# Patient Record
Sex: FEMALE | Race: WHITE | Employment: OTHER | URBAN - METROPOLITAN AREA
[De-identification: names, ages, dates, MRNs, and addresses within clinical notes are randomized per-mention and may not be internally consistent; named-entity substitution may affect disease eponyms.]

---

## 2023-09-12 ENCOUNTER — TELEPHONE (OUTPATIENT)
Dept: SURGERY | Age: 63
End: 2023-09-12

## 2023-09-12 NOTE — TELEPHONE ENCOUNTER
[facility] colorectal surgeon    Colonoscopy questionnaire     Pt Name: Jonathan Lamas   MRN: [id number]   YOB: 1960   Primary Care Physician: Nik Hansen MD         Have you ever had a colonoscopy? yes  When was your last colonoscopy? 2020  Were any polyps removed or any other significant findings? Yes, polyps    2. Have you recently had a stool test to check for colon cancer? No  Was it positive? N/A     3. Do you have any family history of colon cancer? Yes  If yes, which family member had colon cancer? B  Were they diagnosed younger or older than the age of 61? Older    4. Do you have a history of Crohn's disease or Ulcerative Colitis? Yes, ulcerative colitis     5. Do you have a history of constipation? No     6. Do you have a history of bloody or black stools? No     Have you ever had surgery done inside your abdomen? Yes  What surgery? Hemicolectomy     8. Are you taking any blood thinners? No              If yes, what is the name of the blood thinner?  N/A     Scheduling:     Previous History      Past Medical History:   Diagnosis Date    Depression     Depression     Diabetes ([address])     Diabetes mellitus ([address])     Diverticulitis     Extremity numbness     Fibromyalgia     Hemorrhoids     HLD (hyperlipidemia)     HTN (hypertension)     Hypercholesterolemia     Hypertension     Hypothyroid     MDRO (multiple drug resistant organisms) resistance 6/30/2013    urine    Osteoarthritis     Sleep apnea     Thyroid disease     hypothyroidism    Wears glasses          Past Surgical History:   Procedure Laterality Date    BLADDER SURGERY  10/4/12        BRAIN SURGERY      right frontal meningioma removal    CERVICAL SPINE SURGERY  6-27-13    anterior cervical fusion C6-7, removal or hardware C3-6    COLON SURGERY  10/4/12        FOOT SURGERY      three left foot surgeries    HYSTERECTOMY      JOINT REPLACEMENT      SPINAL FUSION  6/28/13    neck vertebral fusions

## 2023-09-12 NOTE — TELEPHONE ENCOUNTER
Pt would like to scheduled colonoscopy with dr Samuel Clifton.  Pt states she has seen him in the past she just can not remember when pt can be reached at [phone]

## 2023-09-14 ENCOUNTER — OFFICE VISIT (OUTPATIENT)
Dept: SURGERY | Age: 63
End: 2023-09-14

## 2023-09-14 VITALS
HEIGHT: 64 IN | OXYGEN SATURATION: 98 % | HEART RATE: 64 BPM | WEIGHT: 234.4 LBS | BODY MASS INDEX: 40.02 KG/M2 | DIASTOLIC BLOOD PRESSURE: 62 MMHG | SYSTOLIC BLOOD PRESSURE: 112 MMHG

## 2023-09-14 DIAGNOSIS — R19.4 CHANGE IN BOWEL HABITS: ICD-10-CM

## 2023-09-14 DIAGNOSIS — R15.9 INCONTINENCE OF FECES, UNSPECIFIED FECAL INCONTINENCE TYPE: Primary | ICD-10-CM

## 2023-09-14 DIAGNOSIS — L30.9 DERMATITIS OF PERIANAL REGION: ICD-10-CM

## 2023-09-14 RX ORDER — LANOLIN ALCOHOL/MO/W.PET/CERES
1 CREAM (GRAM) TOPICAL 2 TIMES DAILY
COMMUNITY

## 2023-09-14 RX ORDER — SEMAGLUTIDE 1.34 MG/ML
1 INJECTION, SOLUTION SUBCUTANEOUS
COMMUNITY
Start: 2023-01-26

## 2023-09-14 RX ORDER — GABAPENTIN 600 MG/1
TABLET, FILM COATED ORAL
COMMUNITY
Start: 2023-07-05

## 2023-09-14 RX ORDER — TRAZODONE HYDROCHLORIDE 100 MG/1
100 TABLET ORAL
COMMUNITY
Start: 2023-08-23

## 2023-09-14 RX ORDER — PANTOPRAZOLE SODIUM 40 MG/1
40 TABLET, DELAYED RELEASE ORAL DAILY
COMMUNITY
Start: 2023-08-27

## 2023-09-14 RX ORDER — ATORVASTATIN CALCIUM 80 MG/1
80 TABLET, FILM COATED ORAL DAILY
COMMUNITY
Start: 2023-07-21

## 2023-09-14 RX ORDER — BUPROPION HYDROCHLORIDE 300 MG/1
300 TABLET ORAL DAILY
COMMUNITY
Start: 2023-08-23

## 2023-09-14 RX ORDER — ACARBOSE 25 MG/1
25 TABLET ORAL 3 TIMES DAILY
COMMUNITY
Start: 2023-08-28

## 2023-09-14 RX ORDER — ANORECTAL OINTMENT 15.7; .44; 24; 20.6 G/100G; G/100G; G/100G; G/100G
OINTMENT TOPICAL DAILY
Qty: 1 EACH | Refills: 4 | Status: SHIPPED | OUTPATIENT
Start: 2023-09-14 | End: 2023-09-21

## 2023-09-14 RX ORDER — INSULIN ASPART 100 [IU]/ML
5 INJECTION, SOLUTION INTRAVENOUS; SUBCUTANEOUS
COMMUNITY

## 2023-09-14 RX ORDER — VENLAFAXINE HYDROCHLORIDE 150 MG/1
150 CAPSULE, EXTENDED RELEASE ORAL DAILY
COMMUNITY
Start: 2023-08-23

## 2023-09-14 RX ORDER — BACLOFEN 10 MG/1
10 TABLET ORAL 3 TIMES DAILY PRN
COMMUNITY
Start: 2023-07-24

## 2023-09-14 RX ORDER — OXYBUTYNIN CHLORIDE 5 MG/1
5 TABLET ORAL 2 TIMES DAILY
COMMUNITY
Start: 2023-07-27

## 2023-09-14 ASSESSMENT — ENCOUNTER SYMPTOMS
ANAL BLEEDING: 0
BLOOD IN STOOL: 0
DIARRHEA: 0
COUGH: 0
NAUSEA: 0
VOMITING: 0
BACK PAIN: 1
RECTAL PAIN: 1
ABDOMINAL PAIN: 0
ABDOMINAL DISTENTION: 0
CHEST TIGHTNESS: 0
CONSTIPATION: 0
COLOR CHANGE: 0
APNEA: 1
SHORTNESS OF BREATH: 0
STRIDOR: 0
WHEEZING: 0

## 2023-09-19 ENCOUNTER — TELEPHONE (OUTPATIENT)
Dept: SURGERY | Age: 63
End: 2023-09-19

## 2023-09-19 NOTE — TELEPHONE ENCOUNTER
Dr Alysa Beckett office called stating they have not received all 7 pages of the fax that was sent in regards to [place]'s medical notes. Please resent entire office notes along with any lab or imaging we may have. Please fax to [phone]    Thank you.

## 2023-10-02 NOTE — TELEPHONE ENCOUNTER
LVM to reschedule patient from 10/12/23 1pm appointment due to Dr. Zayra Golden going to OR. Pt given options to move to the AM the same day or to a different afternoon. Asked pt to call me back to reschedule ASAP.

## 2023-10-03 NOTE — TELEPHONE ENCOUNTER
ATTEMPT 2- LVM to speak to this patient to reschedule her 10/12/23 appointment due to Dr. Gigi Holloway being in the [address].

## 2023-10-12 ENCOUNTER — OFFICE VISIT (OUTPATIENT)
Dept: SURGERY | Age: 63
End: 2023-10-12

## 2023-10-12 VITALS — HEART RATE: 62 BPM | DIASTOLIC BLOOD PRESSURE: 59 MMHG | OXYGEN SATURATION: 97 % | SYSTOLIC BLOOD PRESSURE: 112 MMHG

## 2023-10-12 DIAGNOSIS — R10.32 LEFT LOWER QUADRANT ABDOMINAL PAIN: ICD-10-CM

## 2023-10-12 DIAGNOSIS — R19.4 CHANGE IN BOWEL HABITS: Primary | ICD-10-CM

## 2023-10-12 DIAGNOSIS — R15.9 INCONTINENCE OF FECES, UNSPECIFIED FECAL INCONTINENCE TYPE: ICD-10-CM

## 2023-10-12 RX ORDER — OXYCODONE HYDROCHLORIDE AND ACETAMINOPHEN 5; 325 MG/1; MG/1
1 TABLET ORAL 2 TIMES DAILY PRN
COMMUNITY
Start: 2023-09-22

## 2023-10-12 NOTE — PROGRESS NOTES
Diagnostic colonoscopy    Please schedule at least 2 weeks out.
(PRINIVIL;ZESTRIL) 10 MG tablet Take 1 tablet by mouth daily. 30 tablet 0    docusate sodium 100 MG CAPS Take 100 mg by mouth 2 times daily. 10 capsule 0    levothyroxine (SYNTHROID) 200 MCG tablet Take 1 tablet by mouth Daily. 30 tablet 0    oxyCODONE-acetaminophen (PERCOCET) 5-325 MG per tablet Take 1 tablet by mouth 2 times daily as needed. Max Daily Amount: 2 tablets       No current facility-administered medications for this visit. Allergies   Allergen Reactions    Amitriptyline Hcl      Other reaction(s): Unknown    Demerol Hcl [Meperidine]     Morphine          Physical Exam:      BP (!) 112/59 (Site: Left Upper Arm, Position: Sitting, Cuff Size: Large Adult)   Pulse 62   SpO2 97%     HEENT: WNL  Lymph nodes: Normal cervical lymph nodes  Heart: regular rate and rhythm, no murmurs  Lungs: Bilaterally clear to auscultation. Abdomen: soft, nontender, nondistended, no masses or organomegaly  no rebound tenderness noted  scars from previous incisions healed midline scar. Reducible incisional hernia noted above the umbilicus with a 3 finger wide fascial defect. no bladder distension noted  no abdominal bruits  no pulsatile masses  no CVA tenderness  no inguinal adenopathy  HExtremities: Normal with no palpable edema. Studies Review:     LABS:  CBC:   Lab Results   Component Value Date    WBC 6.6 07/01/2013    HGB 11.6 (L) 07/01/2013    HCT 34.5 (L) 07/01/2013    MCV 89.1 07/01/2013     03/28/2014        BMP:   Lab Results   Component Value Date/Time     07/01/2013 11:39 AM    K 4.1 07/01/2013 11:39 AM     07/01/2013 11:39 AM    CO2 26 07/01/2013 11:39 AM    BUN 23 07/01/2013 11:39 AM    CREATININE 0.79 07/01/2013 11:39 AM    GLUCOSE 420 07/01/2013 11:39 AM    CALCIUM 8.8 07/01/2013 11:39 AM    LABGLOM >60 07/01/2013 11:39 AM         Assessment and Plan:   Assessment:  Abdominal pain of uncertain cause. Fecal incontinence. 1. Change in bowel habits    2.  Incontinence of